# Patient Record
Sex: FEMALE | Race: WHITE | Employment: UNEMPLOYED | URBAN - METROPOLITAN AREA
[De-identification: names, ages, dates, MRNs, and addresses within clinical notes are randomized per-mention and may not be internally consistent; named-entity substitution may affect disease eponyms.]

---

## 2017-06-13 ENCOUNTER — HOSPITAL ENCOUNTER (OUTPATIENT)
Dept: LAB | Age: 52
Discharge: HOME | End: 2017-06-13
Payer: COMMERCIAL

## 2017-06-13 PROCEDURE — 88175 CYTOPATH C/V AUTO FLUID REDO: CPT | Performed by: OBSTETRICS & GYNECOLOGY

## 2017-08-02 ENCOUNTER — HOSPITAL ENCOUNTER (OUTPATIENT)
Dept: MAMMOGRAPHY | Age: 52
Discharge: HOME | End: 2017-08-02
Attending: FAMILY MEDICINE
Payer: COMMERCIAL

## 2017-08-02 DIAGNOSIS — Z12.31 VISIT FOR SCREENING MAMMOGRAM: ICD-10-CM

## 2017-08-02 PROCEDURE — 77067 SCR MAMMO BI INCL CAD: CPT

## 2018-06-12 ENCOUNTER — HOSPITAL ENCOUNTER (OUTPATIENT)
Dept: LAB | Age: 53
Discharge: HOME | End: 2018-06-12
Payer: COMMERCIAL

## 2018-06-12 PROCEDURE — 88175 CYTOPATH C/V AUTO FLUID REDO: CPT | Performed by: OBSTETRICS & GYNECOLOGY

## 2018-08-22 ENCOUNTER — HOSPITAL ENCOUNTER (OUTPATIENT)
Dept: MAMMOGRAPHY | Age: 53
Discharge: HOME | End: 2018-08-22
Attending: FAMILY MEDICINE
Payer: COMMERCIAL

## 2018-08-22 DIAGNOSIS — Z12.31 VISIT FOR SCREENING MAMMOGRAM: ICD-10-CM

## 2018-08-22 PROCEDURE — 77067 SCR MAMMO BI INCL CAD: CPT

## 2019-06-27 ENCOUNTER — HOSPITAL ENCOUNTER (OUTPATIENT)
Dept: LAB | Age: 54
Discharge: HOME | End: 2019-06-27
Payer: COMMERCIAL

## 2019-06-27 PROCEDURE — 88175 CYTOPATH C/V AUTO FLUID REDO: CPT

## 2019-08-30 ENCOUNTER — HOSPITAL ENCOUNTER (OUTPATIENT)
Dept: MAMMOGRAPHY | Age: 54
Discharge: HOME | End: 2019-08-30
Attending: FAMILY MEDICINE
Payer: COMMERCIAL

## 2019-08-30 DIAGNOSIS — Z12.31 VISIT FOR SCREENING MAMMOGRAM: ICD-10-CM

## 2019-08-30 PROCEDURE — 77067 SCR MAMMO BI INCL CAD: CPT

## 2020-09-11 ENCOUNTER — HOSPITAL ENCOUNTER (OUTPATIENT)
Dept: MAMMOGRAPHY | Age: 55
Discharge: HOME | End: 2020-09-11
Attending: FAMILY MEDICINE
Payer: COMMERCIAL

## 2020-09-11 DIAGNOSIS — Z12.31 VISIT FOR SCREENING MAMMOGRAM: ICD-10-CM

## 2020-09-11 PROCEDURE — 77063 BREAST TOMOSYNTHESIS BI: CPT

## 2021-01-05 ENCOUNTER — HOSPITAL ENCOUNTER (OUTPATIENT)
Dept: LAB | Age: 56
Discharge: HOME | End: 2021-01-05
Payer: COMMERCIAL

## 2021-01-05 PROCEDURE — 88175 CYTOPATH C/V AUTO FLUID REDO: CPT

## 2022-02-02 ENCOUNTER — TRANSCRIBE ORDER (OUTPATIENT)
Dept: SCHEDULING | Age: 57
End: 2022-02-02

## 2022-02-02 DIAGNOSIS — Z12.31 ENCOUNTER FOR SCREENING MAMMOGRAM FOR BREAST CANCER: Primary | ICD-10-CM

## 2022-02-09 ENCOUNTER — HOSPITAL ENCOUNTER (OUTPATIENT)
Dept: MAMMOGRAPHY | Age: 57
Discharge: HOME | End: 2022-02-09
Attending: FAMILY MEDICINE
Payer: COMMERCIAL

## 2022-02-09 DIAGNOSIS — Z12.31 ENCOUNTER FOR SCREENING MAMMOGRAM FOR BREAST CANCER: ICD-10-CM

## 2022-02-09 PROCEDURE — 77063 BREAST TOMOSYNTHESIS BI: CPT

## 2022-02-09 PROCEDURE — 77067 SCR MAMMO BI INCL CAD: CPT

## 2023-01-09 ENCOUNTER — TRANSCRIBE ORDER (OUTPATIENT)
Dept: SCHEDULING | Age: 58
End: 2023-01-09

## 2023-01-09 DIAGNOSIS — N95.9 MENOPAUSAL PROBLEM: Primary | ICD-10-CM

## 2023-01-17 ENCOUNTER — TRANSCRIBE ORDER (OUTPATIENT)
Dept: SCHEDULING | Age: 58
End: 2023-01-17

## 2023-01-17 DIAGNOSIS — Z12.31 VISIT FOR SCREENING MAMMOGRAM: Primary | ICD-10-CM

## 2023-01-24 ENCOUNTER — HOSPITAL ENCOUNTER (OUTPATIENT)
Dept: BONE DENSITY | Age: 58
Discharge: HOME | End: 2023-01-24
Attending: FAMILY MEDICINE
Payer: COMMERCIAL

## 2023-01-24 DIAGNOSIS — N95.9 MENOPAUSAL PROBLEM: ICD-10-CM

## 2023-01-24 PROCEDURE — 77080 DXA BONE DENSITY AXIAL: CPT

## 2023-02-01 DIAGNOSIS — N95.9 MENOPAUSAL PROBLEM: Primary | ICD-10-CM

## 2023-02-01 DIAGNOSIS — Z12.31 VISIT FOR SCREENING MAMMOGRAM: Primary | ICD-10-CM

## 2023-02-04 DIAGNOSIS — N95.9 MENOPAUSAL PROBLEM: Primary | ICD-10-CM

## 2023-02-05 DIAGNOSIS — Z12.31 VISIT FOR SCREENING MAMMOGRAM: Primary | ICD-10-CM

## 2023-02-07 ENCOUNTER — APPOINTMENT (OUTPATIENT)
Dept: GENERAL RADIOLOGY | Age: 58
End: 2023-02-07
Attending: EMERGENCY MEDICINE
Payer: COMMERCIAL

## 2023-02-07 ENCOUNTER — HOSPITAL ENCOUNTER (EMERGENCY)
Age: 58
Discharge: HOME | End: 2023-02-07
Attending: EMERGENCY MEDICINE
Payer: COMMERCIAL

## 2023-02-07 VITALS
RESPIRATION RATE: 14 BRPM | TEMPERATURE: 98.4 F | DIASTOLIC BLOOD PRESSURE: 72 MMHG | HEIGHT: 68 IN | WEIGHT: 160 LBS | SYSTOLIC BLOOD PRESSURE: 135 MMHG | HEART RATE: 87 BPM | BODY MASS INDEX: 24.25 KG/M2

## 2023-02-07 DIAGNOSIS — R00.2 PALPITATIONS: ICD-10-CM

## 2023-02-07 DIAGNOSIS — R06.00 DYSPNEA, UNSPECIFIED TYPE: Primary | ICD-10-CM

## 2023-02-07 LAB
ANION GAP SERPL CALC-SCNC: 8 MMOL/L (ref 3–18)
BASOPHILS # BLD: 0 K/UL (ref 0–0.1)
BASOPHILS NFR BLD: 0 % (ref 0–2)
BUN SERPL-MCNC: 16 MG/DL (ref 7–18)
BUN/CREAT SERPL: 18 (ref 12–20)
CALCIUM SERPL-MCNC: 9.4 MG/DL (ref 8.5–10.1)
CHLORIDE SERPL-SCNC: 106 MMOL/L (ref 100–111)
CO2 SERPL-SCNC: 25 MMOL/L (ref 21–32)
CREAT SERPL-MCNC: 0.87 MG/DL (ref 0.6–1.3)
D DIMER PPP FEU-MCNC: 0.27 UG/ML(FEU)
DIFFERENTIAL METHOD BLD: NORMAL
EOSINOPHIL # BLD: 0.3 K/UL (ref 0–0.4)
EOSINOPHIL NFR BLD: 4 % (ref 0–5)
ERYTHROCYTE [DISTWIDTH] IN BLOOD BY AUTOMATED COUNT: 11.8 % (ref 11.6–14.5)
GLUCOSE SERPL-MCNC: 105 MG/DL (ref 74–99)
HCT VFR BLD AUTO: 42.7 % (ref 35–45)
HGB BLD-MCNC: 14.9 G/DL (ref 12–16)
IMM GRANULOCYTES # BLD AUTO: 0 K/UL (ref 0–0.04)
IMM GRANULOCYTES NFR BLD AUTO: 0 % (ref 0–0.5)
LYMPHOCYTES # BLD: 2 K/UL (ref 0.9–3.6)
LYMPHOCYTES NFR BLD: 28 % (ref 21–52)
MAGNESIUM SERPL-MCNC: 2.4 MG/DL (ref 1.6–2.6)
MCH RBC QN AUTO: 31.2 PG (ref 24–34)
MCHC RBC AUTO-ENTMCNC: 34.9 G/DL (ref 31–37)
MCV RBC AUTO: 89.3 FL (ref 78–100)
MONOCYTES # BLD: 0.6 K/UL (ref 0.05–1.2)
MONOCYTES NFR BLD: 9 % (ref 3–10)
NEUTS SEG # BLD: 4.1 K/UL (ref 1.8–8)
NEUTS SEG NFR BLD: 59 % (ref 40–73)
NRBC # BLD: 0 K/UL (ref 0–0.01)
NRBC BLD-RTO: 0 PER 100 WBC
PLATELET # BLD AUTO: 293 K/UL (ref 135–420)
PMV BLD AUTO: 9.8 FL (ref 9.2–11.8)
POTASSIUM SERPL-SCNC: 4.5 MMOL/L (ref 3.5–5.5)
RBC # BLD AUTO: 4.78 M/UL (ref 4.2–5.3)
SODIUM SERPL-SCNC: 139 MMOL/L (ref 136–145)
TROPONIN I SERPL HS-MCNC: 5 NG/L (ref 0–54)
TSH SERPL DL<=0.05 MIU/L-ACNC: 1.51 UIU/ML (ref 0.36–3.74)
WBC # BLD AUTO: 7 K/UL (ref 4.6–13.2)

## 2023-02-07 PROCEDURE — 83735 ASSAY OF MAGNESIUM: CPT

## 2023-02-07 PROCEDURE — 80048 BASIC METABOLIC PNL TOTAL CA: CPT

## 2023-02-07 PROCEDURE — 71045 X-RAY EXAM CHEST 1 VIEW: CPT

## 2023-02-07 PROCEDURE — 93005 ELECTROCARDIOGRAM TRACING: CPT

## 2023-02-07 PROCEDURE — 85025 COMPLETE CBC W/AUTO DIFF WBC: CPT

## 2023-02-07 PROCEDURE — 99285 EMERGENCY DEPT VISIT HI MDM: CPT

## 2023-02-07 PROCEDURE — 85379 FIBRIN DEGRADATION QUANT: CPT

## 2023-02-07 PROCEDURE — 84484 ASSAY OF TROPONIN QUANT: CPT

## 2023-02-07 PROCEDURE — 84443 ASSAY THYROID STIM HORMONE: CPT

## 2023-02-07 PROCEDURE — 74011250636 HC RX REV CODE- 250/636: Performed by: EMERGENCY MEDICINE

## 2023-02-07 RX ORDER — LEVOTHYROXINE AND LIOTHYRONINE 57; 13.5 UG/1; UG/1
90 TABLET ORAL DAILY
COMMUNITY

## 2023-02-07 RX ORDER — SODIUM CHLORIDE 9 MG/ML
500 INJECTION, SOLUTION INTRAVENOUS ONCE
Status: COMPLETED | OUTPATIENT
Start: 2023-02-07 | End: 2023-02-07

## 2023-02-07 RX ADMIN — SODIUM CHLORIDE 500 ML: 9 INJECTION, SOLUTION INTRAVENOUS at 18:33

## 2023-02-07 NOTE — ED TRIAGE NOTES
55 YO F presents to the ED C/O SOB. She states that she has had SOB like this intermittently for years but this was worse. Pt states that she felt her heart racing and arms tingling. All Sx resolved but she feels tired now. Pt reports Hx anxiety.

## 2023-02-07 NOTE — ED PROVIDER NOTES
Pt c/o episode just pta of palpitation, heart racing w sob and tingling/burning in arms b/l. Sx's have resolved. Biba. H/o sim sx's in past, few times, attributed to anxiety. Says curr sx's started under no anxiety or stress. No leg pain or swelling. No       Past Medical History:   Diagnosis Date    Cancer ([facility] [address] 2002    Cervical Cancer: hysterectomy performed    Dyspepsia and other specified disorders of function of stomach     Thyroid disease     hypothyroidism       Past Surgical History:   Procedure Laterality Date    HX BREAST BIOPSY  6/13/2014    RIGHT NIPPLE DUCT EXPLORATION AND EXCISIONAL BIOPSY performed by Merary Mathur MD at [facility] MAIN OR     [address]      HX CHOLECYSTECTOMY  2012    HX HEENT  1996    jaw surgery r/t slipped disc in mandible     HX HYSTERECTOMY  2002    HX TONSIL AND ADENOIDECTOMY  as a child         Family History:   Problem Relation Age of Onset    Breast Cancer Sister     Cancer Other        Social History     Socioeconomic History    Marital status:      Spouse name: Not on file    Number of children: Not on file    Years of education: Not on file    Highest education level: Not on file   Occupational History    Not on file   Tobacco Use    Smoking status: Never    Smokeless tobacco: Never   Vaping Use    Vaping Use: Never used   Substance and Sexual Activity    Alcohol use: Yes     Comment: Very Seldom (socially)    Drug use: No    Sexual activity: Not on file   Other Topics Concern    Not on file   Social History Narrative    Not on file     Social Determinants of Health     Financial Resource Strain: Not on file   Food Insecurity: Not on file   Transportation Needs: Not on file   Physical Activity: Not on file   Stress: Not on file   Social Connections: Not on file   Intimate Partner Violence: Not on file   Housing Stability: Not on file         ALLERGIES: Patient has no known allergies. Review of Systems   Constitutional:  Negative for fever.    HENT: Negative for congestion. Respiratory:  Positive for shortness of breath. Negative for cough. Cardiovascular:  Positive for palpitations. Negative for chest pain. Gastrointestinal:  Negative for abdominal pain and vomiting. Musculoskeletal:  Negative for back pain. Skin:  Negative for rash. Neurological:  Negative for light-headedness. All other systems reviewed and are negative. Vitals:    02/07/23 1750 02/07/23 1757   BP: 135/72    Pulse: 87    Resp: 14    Temp: 98.4 °F (36.9 °C)    Weight:  72.6 kg (160 lb)   Height:  5' 8\" (1.727 m)            Physical Exam  Vitals and nursing note reviewed. Constitutional:       Appearance: She is well-developed. She is not diaphoretic. HENT:      Head: Normocephalic and atraumatic. Mouth/Throat:      Mouth: Mucous membranes are moist.   Eyes:      Pupils: Pupils are equal, round, and reactive to light. Cardiovascular:      Rate and Rhythm: Normal rate and regular rhythm. Heart sounds: No murmur heard. Pulmonary:      Effort: Pulmonary effort is normal.      Breath sounds: No wheezing. Abdominal:      Palpations: Abdomen is soft. Tenderness: There is no abdominal tenderness. Musculoskeletal:         General: No tenderness. Cervical back: Normal range of motion. No rigidity. Skin:     General: Skin is dry. Capillary Refill: Capillary refill takes less than 2 seconds. Findings: No rash. Neurological:      Mental Status: She is alert and oriented to person, place, and time. Psychiatric:         Mood and Affect: Mood normal.        Medical Decision Making  Amount and/or Complexity of Data Reviewed  Labs: ordered. Radiology: ordered. ECG/medicine tests: ordered. Risk  Prescription drug management.            Procedures      Vitals:  Patient Vitals for the past 12 hrs:   Temp Pulse Resp BP   02/07/23 1750 98.4 °F (36.9 °C) 87 14 135/72         Medications ordered:   Medications   0.9% sodium chloride infusion 500 mL (500 mL IntraVENous New Bag 2/7/23 7320)         Lab findings:  Recent Results (from the past 12 hour(s))   CBC WITH AUTOMATED DIFF    Collection Time: 02/07/23  6:20 PM   Result Value Ref Range    WBC 7.0 4.6 - 13.2 K/uL    RBC 4.78 4.20 - 5.30 M/uL    HGB 14.9 12.0 - 16.0 g/dL    HCT 42.7 35.0 - 45.0 %    MCV 89.3 78.0 - 100.0 FL    MCH 31.2 24.0 - 34.0 PG    MCHC 34.9 31.0 - 37.0 g/dL    RDW 11.8 11.6 - 14.5 %    PLATELET 439 760 - 874 K/uL    MPV 9.8 9.2 - 11.8 FL    NRBC 0.0 0  WBC    ABSOLUTE NRBC 0.00 0.00 - 0.01 K/uL    NEUTROPHILS 59 40 - 73 %    LYMPHOCYTES 28 21 - 52 %    MONOCYTES 9 3 - 10 %    EOSINOPHILS 4 0 - 5 %    BASOPHILS 0 0 - 2 %    IMMATURE GRANULOCYTES 0 0.0 - 0.5 %    ABS. NEUTROPHILS 4.1 1.8 - 8.0 K/UL    ABS. LYMPHOCYTES 2.0 0.9 - 3.6 K/UL    ABS. MONOCYTES 0.6 0.05 - 1.2 K/UL    ABS. EOSINOPHILS 0.3 0.0 - 0.4 K/UL    ABS. BASOPHILS 0.0 0.0 - 0.1 K/UL    ABS. IMM.  GRANS. 0.0 0.00 - 0.04 K/UL    DF AUTOMATED     METABOLIC PANEL, BASIC    Collection Time: 02/07/23  6:20 PM   Result Value Ref Range    Sodium 139 136 - 145 mmol/L    Potassium 4.5 3.5 - 5.5 mmol/L    Chloride 106 100 - 111 mmol/L    CO2 25 21 - 32 mmol/L    Anion gap 8 3.0 - 18 mmol/L    Glucose 105 (H) 74 - 99 mg/dL    BUN 16 7.0 - 18 MG/DL    Creatinine 0.87 0.6 - 1.3 MG/DL    BUN/Creatinine ratio 18 12 - 20      eGFR >60 >60 ml/min/1.73m2    Calcium 9.4 8.5 - 10.1 MG/DL   TROPONIN-HIGH SENSITIVITY    Collection Time: 02/07/23  6:20 PM   Result Value Ref Range    Troponin-High Sensitivity 5 0 - 54 ng/L   MAGNESIUM    Collection Time: 02/07/23  6:20 PM   Result Value Ref Range    Magnesium 2.4 1.6 - 2.6 mg/dL   D DIMER    Collection Time: 02/07/23  6:20 PM   Result Value Ref Range    D DIMER 0.27 <0.46 ug/ml(FEU)   TSH 3RD GENERATION    Collection Time: 02/07/23  6:20 PM   Result Value Ref Range    TSH 1.51 0.36 - 3.74 uIU/mL   EKG, 12 LEAD, INITIAL    Collection Time: 02/07/23  6:24 PM   Result Value Ref Range Ventricular Rate 69 BPM    Atrial Rate 69 BPM    P-R Interval 136 ms    QRS Duration 80 ms    Q-T Interval 404 ms    QTC Calculation (Bezet) 432 ms    Calculated P Axis 44 degrees    Calculated R Axis 9 degrees    Calculated T Axis 56 degrees    Diagnosis       Normal sinus rhythm  Normal ECG  When compared with ECG of 07-JUN-2014 09:32,  No significant change was found             X-Ray, CT or other radiology findings or impressions:  XR CHEST PORT   Final Result   1. No acute cardiopulmonary process. Progress notes, Consult notes or additional Procedure notes:   Pt feels better, no current sx's. Nsr on monitor. Neg w/up for emc. Pt declines further monitoring or obs. Wants dc now. Shared decision making. No ind for ct chest.  Not c/w pe/ptx/pna/dissection/acs/acute abd  To dc per pt. Diagnosis:   1. Dyspnea, unspecified type    2. Palpitations        Disposition: home    Follow-up Information       Follow up With Specialties Details Why [place] [address] EMERGENCY DEPT Emergency Medicine Go to  As needed, If symptoms worsen [address] [facility] [address] [place], [address] [facility], [address] [place] [address] General [address]      Lalita Figueroa MD Gastroenterology Schedule an appointment as soon as possible for a visit in 2 days  [facility] [address] [facility] [id number]  [phone]      Jarad Hong MD Cardiovascular Disease Physician, Internal Medicine Physician, Nuclear Medicine Schedule an appointment as soon as possible for a visit in 2 days  [facility]  [phone]               Patient's Medications   Start Taking    No medications on file   Continue Taking    ESTRADIOL (VAGIFEM) 10 MCG TAB VAGINAL TABLET    Insert 10 mcg into vagina daily.     LEVOTHYROXINE (SYNTHROID) 100 MCG TABLET Take  by mouth Daily (before breakfast). OXYCODONE-ACETAMINOPHEN (PERCOCET) 5-325 MG PER TABLET    1 or 2 tablets by mouth every 4 hours as needed    THYROID, PORK, (NP THYROID) 90 MG TABLET    Take 90 mg by mouth daily.    These Medications have changed    No medications on file   Stop Taking    No medications on file

## 2023-02-08 NOTE — DISCHARGE INSTRUCTIONS
Return for pain, fever not resolving with motrin or tylenol, shortness of breath, vomiting, decreased fluid intake, weakness, numbness, dizziness, or any change or concerns. present

## 2023-02-08 NOTE — ED NOTES
D/c summary given to pt. D/c iv site from hand, pressure dressing applied, pt ambulated out of er independently with .

## 2023-02-09 LAB
ATRIAL RATE: 69 BPM
CALCULATED P AXIS, ECG09: 44 DEGREES
CALCULATED R AXIS, ECG10: 9 DEGREES
CALCULATED T AXIS, ECG11: 56 DEGREES
DIAGNOSIS, 93000: NORMAL
P-R INTERVAL, ECG05: 136 MS
Q-T INTERVAL, ECG07: 404 MS
QRS DURATION, ECG06: 80 MS
QTC CALCULATION (BEZET), ECG08: 432 MS
VENTRICULAR RATE, ECG03: 69 BPM

## 2023-02-22 ENCOUNTER — HOSPITAL ENCOUNTER (OUTPATIENT)
Facility: HOSPITAL | Age: 58
Discharge: HOME | End: 2023-02-25
Payer: COMMERCIAL

## 2023-02-22 DIAGNOSIS — Z12.31 VISIT FOR SCREENING MAMMOGRAM: ICD-10-CM

## 2023-02-22 PROCEDURE — 77067 SCR MAMMO BI INCL CAD: CPT

## 2024-02-01 ENCOUNTER — TRANSCRIBE ORDERS (OUTPATIENT)
Facility: HOSPITAL | Age: 59
End: 2024-02-01

## 2024-02-01 DIAGNOSIS — Z12.31 VISIT FOR SCREENING MAMMOGRAM: Primary | ICD-10-CM

## 2024-02-23 ENCOUNTER — HOSPITAL ENCOUNTER (OUTPATIENT)
Facility: HOSPITAL | Age: 59
Discharge: HOME | End: 2024-02-23
Attending: FAMILY MEDICINE
Payer: COMMERCIAL

## 2024-02-23 VITALS — BODY MASS INDEX: 24.25 KG/M2 | HEIGHT: 68 IN | WEIGHT: 160 LBS

## 2024-02-23 DIAGNOSIS — Z12.31 VISIT FOR SCREENING MAMMOGRAM: ICD-10-CM

## 2024-02-23 PROCEDURE — 77063 BREAST TOMOSYNTHESIS BI: CPT

## 2025-04-09 ENCOUNTER — HOSPITAL ENCOUNTER (OUTPATIENT)
Facility: HOSPITAL | Age: 60
Discharge: HOME | End: 2025-04-12
Payer: COMMERCIAL

## 2025-04-09 VITALS — BODY MASS INDEX: 24.55 KG/M2 | WEIGHT: 162 LBS | HEIGHT: 68 IN

## 2025-04-09 DIAGNOSIS — Z12.31 VISIT FOR SCREENING MAMMOGRAM: ICD-10-CM

## 2025-04-09 PROCEDURE — 77067 SCR MAMMO BI INCL CAD: CPT
